# Patient Record
Sex: FEMALE | Race: OTHER | HISPANIC OR LATINO
[De-identification: names, ages, dates, MRNs, and addresses within clinical notes are randomized per-mention and may not be internally consistent; named-entity substitution may affect disease eponyms.]

---

## 2020-03-30 ENCOUNTER — TRANSCRIPTION ENCOUNTER (OUTPATIENT)
Age: 25
End: 2020-03-30

## 2020-03-31 ENCOUNTER — ASOB RESULT (OUTPATIENT)
Age: 25
End: 2020-03-31

## 2020-03-31 ENCOUNTER — INPATIENT (INPATIENT)
Facility: HOSPITAL | Age: 25
LOS: 3 days | Discharge: HOME | End: 2020-04-04
Attending: OBSTETRICS & GYNECOLOGY | Admitting: OBSTETRICS & GYNECOLOGY
Payer: MEDICAID

## 2020-03-31 ENCOUNTER — APPOINTMENT (OUTPATIENT)
Dept: ANTEPARTUM | Facility: CLINIC | Age: 25
End: 2020-03-31
Payer: MEDICAID

## 2020-03-31 ENCOUNTER — RESULT REVIEW (OUTPATIENT)
Age: 25
End: 2020-03-31

## 2020-03-31 VITALS — WEIGHT: 119.05 LBS | HEIGHT: 58 IN

## 2020-03-31 DIAGNOSIS — Z34.80 ENCOUNTER FOR SUPERVISION OF OTHER NORMAL PREGNANCY, UNSPECIFIED TRIMESTER: ICD-10-CM

## 2020-03-31 DIAGNOSIS — O26.899 OTHER SPECIFIED PREGNANCY RELATED CONDITIONS, UNSPECIFIED TRIMESTER: ICD-10-CM

## 2020-03-31 DIAGNOSIS — Z3A.00 WEEKS OF GESTATION OF PREGNANCY NOT SPECIFIED: ICD-10-CM

## 2020-03-31 PROBLEM — Z00.00 ENCOUNTER FOR PREVENTIVE HEALTH EXAMINATION: Status: ACTIVE | Noted: 2020-03-31

## 2020-03-31 LAB
ABO RH CONFIRMATION: SIGNIFICANT CHANGE UP
AMNISURE ROM (RUPTURE OF MEMBRANES): NEGATIVE — SIGNIFICANT CHANGE UP
APTT BLD: 38.9 SEC — HIGH (ref 27.5–36.3)
BASOPHILS # BLD AUTO: 0.02 K/UL — SIGNIFICANT CHANGE UP (ref 0–0.2)
BASOPHILS NFR BLD AUTO: 0.2 % — SIGNIFICANT CHANGE UP (ref 0–2)
BLD GP AB SCN SERPL QL: SIGNIFICANT CHANGE UP
EOSINOPHIL # BLD AUTO: 0.01 K/UL — SIGNIFICANT CHANGE UP (ref 0–0.5)
EOSINOPHIL NFR BLD AUTO: 0.1 % — SIGNIFICANT CHANGE UP (ref 0–6)
FIBRINOGEN PPP-MCNC: 562 MG/DL — HIGH (ref 350–510)
HCT VFR BLD CALC: 31 % — LOW (ref 34.5–45)
HGB BLD-MCNC: 10.2 G/DL — LOW (ref 11.5–15.5)
IMM GRANULOCYTES NFR BLD AUTO: 1.5 % — SIGNIFICANT CHANGE UP (ref 0–1.5)
INR BLD: 0.93 RATIO — SIGNIFICANT CHANGE UP (ref 0.88–1.16)
LYMPHOCYTES # BLD AUTO: 1.12 K/UL — SIGNIFICANT CHANGE UP (ref 1–3.3)
LYMPHOCYTES # BLD AUTO: 12 % — LOW (ref 13–44)
MCHC RBC-ENTMCNC: 28.3 PG — SIGNIFICANT CHANGE UP (ref 27–34)
MCHC RBC-ENTMCNC: 32.9 GM/DL — SIGNIFICANT CHANGE UP (ref 32–36)
MCV RBC AUTO: 85.9 FL — SIGNIFICANT CHANGE UP (ref 80–100)
MONOCYTES # BLD AUTO: 0.42 K/UL — SIGNIFICANT CHANGE UP (ref 0–0.9)
MONOCYTES NFR BLD AUTO: 4.5 % — SIGNIFICANT CHANGE UP (ref 2–14)
NEUTROPHILS # BLD AUTO: 7.66 K/UL — HIGH (ref 1.8–7.4)
NEUTROPHILS NFR BLD AUTO: 81.7 % — HIGH (ref 43–77)
NRBC # BLD: 0 /100 WBCS — SIGNIFICANT CHANGE UP (ref 0–0)
PLATELET # BLD AUTO: 214 K/UL — SIGNIFICANT CHANGE UP (ref 150–400)
PROTHROM AB SERPL-ACNC: 10.5 SEC — SIGNIFICANT CHANGE UP (ref 10–12.9)
RBC # BLD: 3.61 M/UL — LOW (ref 3.8–5.2)
RBC # FLD: 13.8 % — SIGNIFICANT CHANGE UP (ref 10.3–14.5)
WBC # BLD: 9.37 K/UL — SIGNIFICANT CHANGE UP (ref 3.8–10.5)
WBC # FLD AUTO: 9.37 K/UL — SIGNIFICANT CHANGE UP (ref 3.8–10.5)

## 2020-03-31 PROCEDURE — 76816 OB US FOLLOW-UP PER FETUS: CPT

## 2020-03-31 RX ORDER — CEFAZOLIN SODIUM 1 G
2000 VIAL (EA) INJECTION ONCE
Refills: 0 | Status: COMPLETED | OUTPATIENT
Start: 2020-03-31 | End: 2020-03-31

## 2020-03-31 RX ORDER — CITRIC ACID/SODIUM CITRATE 300-500 MG
30 SOLUTION, ORAL ORAL ONCE
Refills: 0 | Status: COMPLETED | OUTPATIENT
Start: 2020-03-31 | End: 2020-03-31

## 2020-03-31 RX ORDER — SODIUM CHLORIDE 9 MG/ML
1000 INJECTION, SOLUTION INTRAVENOUS ONCE
Refills: 0 | Status: COMPLETED | OUTPATIENT
Start: 2020-03-31 | End: 2020-04-01

## 2020-03-31 RX ORDER — FAMOTIDINE 10 MG/ML
20 INJECTION INTRAVENOUS ONCE
Refills: 0 | Status: COMPLETED | OUTPATIENT
Start: 2020-03-31 | End: 2020-03-31

## 2020-03-31 RX ORDER — OXYTOCIN 10 UNIT/ML
333.33 VIAL (ML) INJECTION
Qty: 20 | Refills: 0 | Status: DISCONTINUED | OUTPATIENT
Start: 2020-03-31 | End: 2020-04-04

## 2020-03-31 RX ORDER — ACETAMINOPHEN 500 MG
1000 TABLET ORAL ONCE
Refills: 0 | Status: COMPLETED | OUTPATIENT
Start: 2020-03-31 | End: 2020-03-31

## 2020-03-31 RX ORDER — SODIUM CHLORIDE 9 MG/ML
1000 INJECTION, SOLUTION INTRAVENOUS
Refills: 0 | Status: DISCONTINUED | OUTPATIENT
Start: 2020-03-31 | End: 2020-04-04

## 2020-03-31 RX ORDER — SODIUM CHLORIDE 9 MG/ML
1000 INJECTION, SOLUTION INTRAVENOUS
Refills: 0 | Status: DISCONTINUED | OUTPATIENT
Start: 2020-03-31 | End: 2020-04-02

## 2020-03-31 RX ORDER — METOCLOPRAMIDE HCL 10 MG
10 TABLET ORAL ONCE
Refills: 0 | Status: DISCONTINUED | OUTPATIENT
Start: 2020-03-31 | End: 2020-04-02

## 2020-03-31 RX ADMIN — Medication 100 MILLIGRAM(S): at 23:50

## 2020-03-31 RX ADMIN — FAMOTIDINE 20 MILLIGRAM(S): 10 INJECTION INTRAVENOUS at 23:52

## 2020-03-31 RX ADMIN — SODIUM CHLORIDE 125 MILLILITER(S): 9 INJECTION, SOLUTION INTRAVENOUS at 22:43

## 2020-03-31 RX ADMIN — Medication 30 MILLILITER(S): at 23:30

## 2020-03-31 RX ADMIN — Medication 400 MILLIGRAM(S): at 21:20

## 2020-04-01 LAB
HBV SURFACE AG SERPL QL IA: SIGNIFICANT CHANGE UP
HIV 1 & 2 AB SERPL IA.RAPID: SIGNIFICANT CHANGE UP
HIV 1+2 AB+HIV1 P24 AG SERPL QL IA: SIGNIFICANT CHANGE UP
SARS-COV-2 RNA SPEC QL NAA+PROBE: DETECTED
T PALLIDUM AB TITR SER: NEGATIVE — SIGNIFICANT CHANGE UP

## 2020-04-01 PROCEDURE — 88307 TISSUE EXAM BY PATHOLOGIST: CPT | Mod: 26

## 2020-04-01 RX ORDER — OXYCODONE HYDROCHLORIDE 5 MG/1
5 TABLET ORAL ONCE
Refills: 0 | Status: DISCONTINUED | OUTPATIENT
Start: 2020-04-01 | End: 2020-04-04

## 2020-04-01 RX ORDER — OXYCODONE HYDROCHLORIDE 5 MG/1
5 TABLET ORAL ONCE
Refills: 0 | Status: DISCONTINUED | OUTPATIENT
Start: 2020-04-01 | End: 2020-04-01

## 2020-04-01 RX ORDER — ASCORBIC ACID 60 MG
500 TABLET,CHEWABLE ORAL DAILY
Refills: 0 | Status: DISCONTINUED | OUTPATIENT
Start: 2020-04-01 | End: 2020-04-03

## 2020-04-01 RX ORDER — FERROUS SULFATE 325(65) MG
325 TABLET ORAL DAILY
Refills: 0 | Status: DISCONTINUED | OUTPATIENT
Start: 2020-04-01 | End: 2020-04-04

## 2020-04-01 RX ORDER — ACETAMINOPHEN 500 MG
1000 TABLET ORAL EVERY 6 HOURS
Refills: 0 | Status: COMPLETED | OUTPATIENT
Start: 2020-04-01 | End: 2020-04-02

## 2020-04-01 RX ORDER — OXYCODONE HYDROCHLORIDE 5 MG/1
5 TABLET ORAL
Refills: 0 | Status: DISCONTINUED | OUTPATIENT
Start: 2020-04-01 | End: 2020-04-04

## 2020-04-01 RX ORDER — TETANUS TOXOID, REDUCED DIPHTHERIA TOXOID AND ACELLULAR PERTUSSIS VACCINE, ADSORBED 5; 2.5; 8; 8; 2.5 [IU]/.5ML; [IU]/.5ML; UG/.5ML; UG/.5ML; UG/.5ML
0.5 SUSPENSION INTRAMUSCULAR ONCE
Refills: 0 | Status: DISCONTINUED | OUTPATIENT
Start: 2020-04-01 | End: 2020-04-04

## 2020-04-01 RX ORDER — HEPARIN SODIUM 5000 [USP'U]/ML
5000 INJECTION INTRAVENOUS; SUBCUTANEOUS EVERY 12 HOURS
Refills: 0 | Status: DISCONTINUED | OUTPATIENT
Start: 2020-04-01 | End: 2020-04-04

## 2020-04-01 RX ORDER — DIPHENHYDRAMINE HCL 50 MG
25 CAPSULE ORAL EVERY 6 HOURS
Refills: 0 | Status: DISCONTINUED | OUTPATIENT
Start: 2020-04-01 | End: 2020-04-04

## 2020-04-01 RX ORDER — LANOLIN
1 OINTMENT (GRAM) TOPICAL EVERY 6 HOURS
Refills: 0 | Status: DISCONTINUED | OUTPATIENT
Start: 2020-04-01 | End: 2020-04-04

## 2020-04-01 RX ORDER — OXYTOCIN 10 UNIT/ML
333.33 VIAL (ML) INJECTION
Qty: 20 | Refills: 0 | Status: DISCONTINUED | OUTPATIENT
Start: 2020-04-01 | End: 2020-04-04

## 2020-04-01 RX ORDER — SODIUM CHLORIDE 9 MG/ML
1000 INJECTION, SOLUTION INTRAVENOUS
Refills: 0 | Status: DISCONTINUED | OUTPATIENT
Start: 2020-04-01 | End: 2020-04-04

## 2020-04-01 RX ORDER — SIMETHICONE 80 MG/1
80 TABLET, CHEWABLE ORAL EVERY 4 HOURS
Refills: 0 | Status: DISCONTINUED | OUTPATIENT
Start: 2020-04-01 | End: 2020-04-04

## 2020-04-01 RX ORDER — ACETAMINOPHEN 500 MG
975 TABLET ORAL EVERY 6 HOURS
Refills: 0 | Status: COMPLETED | OUTPATIENT
Start: 2020-04-01 | End: 2021-02-28

## 2020-04-01 RX ORDER — MAGNESIUM HYDROXIDE 400 MG/1
30 TABLET, CHEWABLE ORAL
Refills: 0 | Status: DISCONTINUED | OUTPATIENT
Start: 2020-04-01 | End: 2020-04-04

## 2020-04-01 RX ADMIN — Medication 400 MILLIGRAM(S): at 11:04

## 2020-04-01 RX ADMIN — Medication 500 MILLIGRAM(S): at 18:01

## 2020-04-01 RX ADMIN — OXYCODONE HYDROCHLORIDE 5 MILLIGRAM(S): 5 TABLET ORAL at 21:04

## 2020-04-01 RX ADMIN — Medication 400 MILLIGRAM(S): at 18:01

## 2020-04-01 RX ADMIN — SODIUM CHLORIDE 75 MILLILITER(S): 9 INJECTION, SOLUTION INTRAVENOUS at 02:00

## 2020-04-01 RX ADMIN — Medication 400 MILLIGRAM(S): at 06:53

## 2020-04-01 RX ADMIN — Medication 1 TABLET(S): at 11:04

## 2020-04-01 RX ADMIN — SODIUM CHLORIDE 2000 MILLILITER(S): 9 INJECTION, SOLUTION INTRAVENOUS at 00:00

## 2020-04-01 RX ADMIN — Medication 325 MILLIGRAM(S): at 11:04

## 2020-04-01 RX ADMIN — HEPARIN SODIUM 5000 UNIT(S): 5000 INJECTION INTRAVENOUS; SUBCUTANEOUS at 14:00

## 2020-04-01 RX ADMIN — Medication 1000 MILLIUNIT(S)/MIN: at 00:54

## 2020-04-01 NOTE — CHART NOTE - NSCHARTNOTEFT_GEN_A_CORE
Pt seen at bedside offers no complaints. Denies n/v, CP; SOB; palpitations; or dizziness    T(C): 36.8 (04-01-20 @ 06:30), Max: 36.9 (04-01-20 @ 02:00)  HR: 78 (04-01-20 @ 06:30) (66 - 90)  BP: 108/68 (04-01-20 @ 06:30) (93/51 - 108/68)  RR: 16 (04-01-20 @ 06:30) (12 - 16)  SpO2: 97% (04-01-20 @ 06:30) (97% - 100%)    abd: soft/nt, fundus firm, dressing in place C/D/I  pelvic: minimal lochia  ext: venodynes in place; no calf pain    A/P: POD #0 s/p rpt c/s c/b fever, possible covid 19   follow up COVID results   cont close monitor   cont post op care  d/w Dr. Singh

## 2020-04-02 ENCOUNTER — TRANSCRIPTION ENCOUNTER (OUTPATIENT)
Age: 25
End: 2020-04-02

## 2020-04-02 LAB
BASOPHILS # BLD AUTO: 0.01 K/UL — SIGNIFICANT CHANGE UP (ref 0–0.2)
BASOPHILS NFR BLD AUTO: 0.1 % — SIGNIFICANT CHANGE UP (ref 0–2)
EOSINOPHIL # BLD AUTO: 0.05 K/UL — SIGNIFICANT CHANGE UP (ref 0–0.5)
EOSINOPHIL NFR BLD AUTO: 0.6 % — SIGNIFICANT CHANGE UP (ref 0–6)
HCT VFR BLD CALC: 29.8 % — LOW (ref 34.5–45)
HGB BLD-MCNC: 9.7 G/DL — LOW (ref 11.5–15.5)
IMM GRANULOCYTES NFR BLD AUTO: 1.6 % — HIGH (ref 0–1.5)
LYMPHOCYTES # BLD AUTO: 1.64 K/UL — SIGNIFICANT CHANGE UP (ref 1–3.3)
LYMPHOCYTES # BLD AUTO: 18.5 % — SIGNIFICANT CHANGE UP (ref 13–44)
MCHC RBC-ENTMCNC: 28.2 PG — SIGNIFICANT CHANGE UP (ref 27–34)
MCHC RBC-ENTMCNC: 32.6 GM/DL — SIGNIFICANT CHANGE UP (ref 32–36)
MCV RBC AUTO: 86.6 FL — SIGNIFICANT CHANGE UP (ref 80–100)
MEV IGM SER-ACNC: <20 AU/ML — SIGNIFICANT CHANGE UP (ref 0–19.9)
MONOCYTES # BLD AUTO: 0.46 K/UL — SIGNIFICANT CHANGE UP (ref 0–0.9)
MONOCYTES NFR BLD AUTO: 5.2 % — SIGNIFICANT CHANGE UP (ref 2–14)
NEUTROPHILS # BLD AUTO: 6.57 K/UL — SIGNIFICANT CHANGE UP (ref 1.8–7.4)
NEUTROPHILS NFR BLD AUTO: 74 % — SIGNIFICANT CHANGE UP (ref 43–77)
NRBC # BLD: 0 /100 WBCS — SIGNIFICANT CHANGE UP (ref 0–0)
PLATELET # BLD AUTO: 279 K/UL — SIGNIFICANT CHANGE UP (ref 150–400)
RBC # BLD: 3.44 M/UL — LOW (ref 3.8–5.2)
RBC # FLD: 14.2 % — SIGNIFICANT CHANGE UP (ref 10.3–14.5)
WBC # BLD: 8.87 K/UL — SIGNIFICANT CHANGE UP (ref 3.8–10.5)
WBC # FLD AUTO: 8.87 K/UL — SIGNIFICANT CHANGE UP (ref 3.8–10.5)

## 2020-04-02 RX ORDER — OXYCODONE HYDROCHLORIDE 5 MG/1
5 TABLET ORAL ONCE
Refills: 0 | Status: DISCONTINUED | OUTPATIENT
Start: 2020-04-02 | End: 2020-04-02

## 2020-04-02 RX ORDER — ACETAMINOPHEN 500 MG
975 TABLET ORAL EVERY 6 HOURS
Refills: 0 | Status: DISCONTINUED | OUTPATIENT
Start: 2020-04-02 | End: 2020-04-04

## 2020-04-02 RX ADMIN — SIMETHICONE 80 MILLIGRAM(S): 80 TABLET, CHEWABLE ORAL at 21:29

## 2020-04-02 RX ADMIN — Medication 400 MILLIGRAM(S): at 00:00

## 2020-04-02 RX ADMIN — SODIUM CHLORIDE 75 MILLILITER(S): 9 INJECTION, SOLUTION INTRAVENOUS at 00:00

## 2020-04-02 RX ADMIN — Medication 1 TABLET(S): at 14:57

## 2020-04-02 RX ADMIN — OXYCODONE HYDROCHLORIDE 5 MILLIGRAM(S): 5 TABLET ORAL at 07:17

## 2020-04-02 RX ADMIN — Medication 975 MILLIGRAM(S): at 21:32

## 2020-04-02 RX ADMIN — HEPARIN SODIUM 5000 UNIT(S): 5000 INJECTION INTRAVENOUS; SUBCUTANEOUS at 01:02

## 2020-04-02 RX ADMIN — Medication 500 MILLIGRAM(S): at 14:57

## 2020-04-02 RX ADMIN — Medication 325 MILLIGRAM(S): at 14:57

## 2020-04-02 RX ADMIN — HEPARIN SODIUM 5000 UNIT(S): 5000 INJECTION INTRAVENOUS; SUBCUTANEOUS at 14:57

## 2020-04-02 NOTE — DISCHARGE NOTE OB - HOSPITAL COURSE
patient admitted for oligohydramnios  repeat  performed  covid +,  asymptomatic, ID consult done  otherwise routine post op course  patient discharged home in stable condition once all milestone met

## 2020-04-02 NOTE — DISCHARGE NOTE OB - MEDICATION SUMMARY - MEDICATIONS TO TAKE
I will START or STAY ON the medications listed below when I get home from the hospital:    acetaminophen 325 mg oral tablet  -- 2 tab(s) by mouth every 6 hours, As Needed   -- Indication: For pain/fever    Prenatal Multivitamins with Folic Acid 1 mg oral tablet  -- 1 tab(s) by mouth once a day  -- Indication: For Supplementation    Colace 100 mg oral capsule  -- 1 cap(s) by mouth 2 times a day   -- Medication should be taken with plenty of water.    -- Indication: For Stool softener I will START or STAY ON the medications listed below when I get home from the hospital:    acetaminophen 325 mg oral tablet  -- 2 tab(s) by mouth every 6 hours, As Needed   -- Indication: For pain/fever    Prenatal Multivitamins with Folic Acid 1 mg oral tablet  -- 1 tab(s) by mouth once a day  -- Indication: For Supplementation    Colace 100 mg oral capsule  -- 1 cap(s) by mouth 2 times a day   -- Medication should be taken with plenty of water.    -- Indication: For Stool softener    Vitamin C 1000 mg oral tablet  -- 1 tab(s) by mouth 2 times a day   -- Indication: For for covid -19 +

## 2020-04-02 NOTE — PROGRESS NOTE ADULT - SUBJECTIVE AND OBJECTIVE BOX
Patient seen resting comfortably.  No new complaints.  Denies HA, CP, SOB, N/V/D, dizziness, palpitations, worsening abdominal pain, worsening vaginal bleeding, or any other concerns. + Ambulation, + void without difficulty, + flatus - tolerating regular diet. Attempting breastfeeding.     Vital Signs Last 24 Hrs  T(C): 36.3 (2020 06:56), Max: 37.1 (2020 18:00)  T(F): 97.4 (2020 06:56), Max: 98.8 (2020 18:00)  HR: 78 (2020 06:56) (78 - 96)  BP: 103/70 (2020 06:56) (103/70 - 115/57)  BP(mean): 82 (2020 00:09) (74 - 82)  RR: 16 (2020 06:56) (16 - 18)  SpO2: 98% (2020 06:56) (97% - 100%)    Gen: A&O x 3, NAD  Chest: CTABL  Cardiac: S1+S2+ RRR  Breast: Soft, nontender, nonengorged  Abdomen: Nontender, nondistended, +BS, Incision C/D/I  Gyn: Minimal bleeding  Extremities: Nontender, DTRS 2+, no worsening edema, venodynes intact                          10.2   9.37  )-----------( 214      ( 31 Mar 2020 22:07 )             31.0       A/P:  Patient is a 24 year old P2 s/p  section, POD #1. Patient is COVID positive and under isolation.    -Pain management as needed  -Advance as per protocol  -OOB and ambulate  -Repeat CBC in am  -Encourage breastfeeding

## 2020-04-02 NOTE — DISCHARGE NOTE OB - CARE PLAN
Principal Discharge DX:	 delivery delivered  Goal:	pain management, encourage breast feeding  Assessment and plan of treatment:	Continue breastfeeding.  Motrin as needed for pain.  Ambulate daily.  No heavy lifting or anything per vagina x 6 weeks - no sex, tampons, douching, tub baths, etc.  Follow up in office in 1-2 weeks for incision check, and then at 6 weeks for postpartum check.  Secondary Diagnosis:	Coronavirus infection  Assessment and plan of treatment:	ID consult done  wash hands frequently  Call your Dr if your have any symptoms ( ie fever, shortness of breath or cough)  practice social distancing  Express breast milk via pump while contagious

## 2020-04-02 NOTE — DISCHARGE NOTE OB - CARE PROVIDER_API CALL
Preeti Singh)  Obstetrics and Gynecology  [address] [place] [address]  Phone: [phone]  Fax: [phone]  Established Patient  Follow Up Time: 2 weeks

## 2020-04-02 NOTE — DISCHARGE NOTE OB - MATERIALS PROVIDED
(1) bedfast Back To Sleep Handout/Shaken Baby Prevention Handout/Birth Certificate Instructions/[facility] Hearing Screen Program/Breastfeeding Mother’s Support Group Information/Guide to Postpartum Care/Discharge Medication Information for Patients and Families Pocket Guide/[facility] [place] Screening Program/Breastfeeding Guide and Packet

## 2020-04-02 NOTE — DISCHARGE NOTE OB - PLAN OF CARE
pain management, encourage breast feeding Continue breastfeeding.  Motrin as needed for pain.  Ambulate daily.  No heavy lifting or anything per vagina x 6 weeks - no sex, tampons, douching, tub baths, etc.  Follow up in office in 1-2 weeks for incision check, and then at 6 weeks for postpartum check. ID consult done  wash hands frequently  Call your Dr if your have any symptoms ( ie fever, shortness of breath or cough)  practice social distancing  Express breast milk via pump while contagious

## 2020-04-03 DIAGNOSIS — B34.2 CORONAVIRUS INFECTION, UNSPECIFIED: ICD-10-CM

## 2020-04-03 LAB
HCT VFR BLD CALC: 30.3 % — LOW (ref 34.5–45)
HGB BLD-MCNC: 9.9 G/DL — LOW (ref 11.5–15.5)
MCHC RBC-ENTMCNC: 28.1 PG — SIGNIFICANT CHANGE UP (ref 27–34)
MCHC RBC-ENTMCNC: 32.7 GM/DL — SIGNIFICANT CHANGE UP (ref 32–36)
MCV RBC AUTO: 86.1 FL — SIGNIFICANT CHANGE UP (ref 80–100)
NRBC # BLD: 0 /100 WBCS — SIGNIFICANT CHANGE UP (ref 0–0)
PLATELET # BLD AUTO: 381 K/UL — SIGNIFICANT CHANGE UP (ref 150–400)
RBC # BLD: 3.52 M/UL — LOW (ref 3.8–5.2)
RBC # FLD: 14.1 % — SIGNIFICANT CHANGE UP (ref 10.3–14.5)
WBC # BLD: 7.32 K/UL — SIGNIFICANT CHANGE UP (ref 3.8–10.5)
WBC # FLD AUTO: 7.32 K/UL — SIGNIFICANT CHANGE UP (ref 3.8–10.5)

## 2020-04-03 RX ORDER — DOCUSATE SODIUM 100 MG
1 CAPSULE ORAL
Qty: 60 | Refills: 0
Start: 2020-04-03 | End: 2020-05-02

## 2020-04-03 RX ORDER — ACETAMINOPHEN 500 MG
2 TABLET ORAL
Qty: 80 | Refills: 0
Start: 2020-04-03 | End: 2020-04-12

## 2020-04-03 RX ADMIN — HEPARIN SODIUM 5000 UNIT(S): 5000 INJECTION INTRAVENOUS; SUBCUTANEOUS at 13:19

## 2020-04-03 RX ADMIN — SODIUM CHLORIDE 75 MILLILITER(S): 9 INJECTION, SOLUTION INTRAVENOUS at 06:48

## 2020-04-03 RX ADMIN — Medication 500 MILLIGRAM(S): at 13:19

## 2020-04-03 RX ADMIN — Medication 1 TABLET(S): at 13:19

## 2020-04-03 RX ADMIN — HEPARIN SODIUM 5000 UNIT(S): 5000 INJECTION INTRAVENOUS; SUBCUTANEOUS at 02:00

## 2020-04-03 RX ADMIN — Medication 0.5 MILLILITER(S): at 02:00

## 2020-04-03 RX ADMIN — Medication 975 MILLIGRAM(S): at 13:18

## 2020-04-03 RX ADMIN — Medication 975 MILLIGRAM(S): at 18:54

## 2020-04-03 RX ADMIN — Medication 325 MILLIGRAM(S): at 13:19

## 2020-04-03 NOTE — PROGRESS NOTE ADULT - SUBJECTIVE AND OBJECTIVE BOX
OBGYN PA NOTE POD2  Patient seen and evaluated at bedside,  resting comfortably w/o any acute  complaints.  Denies fever, HA, CP, SOB, N/V/D, dizziness, palpitations, worsening abdominal pain, worsening vaginal bleeding, or any other concerns.  Ambulating and voiding without difficulty.  Passing flatus and tolerating regular diet.  Attempting to breastfeed.     Vital Signs Last 24 Hrs  T(C): 36.4 (2020 06:50), Max: 37.6 (2020 18:37)  T(F): 97.5 (2020 06:50), Max: 99.7 (2020 18:37)  HR: 81 (2020 06:50) (81 - 96)  BP: 91/59 (2020 06:50) (91/59 - 115/76)  BP(mean): --  RR: 18 (2020 06:50) (16 - 18)  SpO2: 99% (2020 06:50) (98% - 99%)    Physical Exam:     Gen: A&Ox 3, NAD  Chest: CTAB/L  Cardiac: S1+S2+ RRR  Breast: Soft, nontender, nonengorged  Abdomen: Soft, nontender, Fundus firm below umbilicus, +BS. incision clean, dry and intact  Gyn: Mild lochia,   Extremities: Nontender, DTRS 2+, no worsening edema                          9.9    7.32  )-----------( 381      ( 2020 06:57 )             30.3         A/P:  24y POD # 2 s/p repeat  @36w3d, oligohydramnios, Covid +. otherwise  in stable condition   -ID consult done via telephone with Dr Medrano.  precautions reviewed with patient  - discharge instruction given  - patient verbalized understanding  d/w Dr Singh OBGYN PA NOTE POD2  Patient seen and evaluated at bedside,  resting comfortably w/o any acute  complaints.  Denies fever, HA, CP, SOB, N/V/D, dizziness, palpitations, worsening abdominal pain, worsening vaginal bleeding, or any other concerns.  Ambulating and voiding without difficulty.  Passing flatus and tolerating regular diet.  Attempting to breastfeed.     Vital Signs Last 24 Hrs  T(C): 36.4 (2020 06:50), Max: 37.6 (2020 18:37)  T(F): 97.5 (2020 06:50), Max: 99.7 (2020 18:37)  HR: 81 (2020 06:50) (81 - 96)  BP: 91/59 (2020 06:50) (91/59 - 115/76)  BP(mean): --  RR: 18 (2020 06:50) (16 - 18)  SpO2: 99% (2020 06:50) (98% - 99%)    Physical Exam:     Gen: A&Ox 3, NAD  Chest: CTAB/L  Cardiac: S1+S2+ RRR  Breast: Soft, nontender, nonengorged  Abdomen: Soft, nontender, Fundus firm below umbilicus, +BS. incision clean, dry and intact  Gyn: Mild lochia,   Extremities: Nontender, DTRS 2+, no worsening edema                          9.9    7.32  )-----------( 381      ( 2020 06:57 )             30.3       A/P:  24y POD # 2 s/p repeat  @36w3d, oligohydramnios, Covid +. otherwise  in stable condition   -ID consult done via telephone with Dr Medrano.  precautions reviewed with patient, no further intervention needed at this time  - continue post op care  - for possible POD 2 discharge home once  is cleared  d/w Dr Singh

## 2020-04-03 NOTE — PROGRESS NOTE ADULT - ASSESSMENT
A/P:  24y POD # 2 s/p repeat  @36w3d, oligohydramnios, Covid +. otherwise in stable condition   -ID consult done via telephone with Dr Medrano. Precautions reviewed with patient  - discharge instruction given  - patient verbalized understanding  d/w Dr Singh A/P:  24y POD # 2 s/p repeat  @36w3d, oligohydramnios, Covid +. otherwise  in stable condition   -ID consult done via telephone with Dr Medrano.  precautions reviewed with patient, no further intervention needed at this time  - continue post op care  - for possible POD 2 discharge home once  is cleared  d/w Dr Singh

## 2020-04-04 VITALS
DIASTOLIC BLOOD PRESSURE: 64 MMHG | HEART RATE: 74 BPM | SYSTOLIC BLOOD PRESSURE: 99 MMHG | RESPIRATION RATE: 16 BRPM | TEMPERATURE: 98 F | OXYGEN SATURATION: 98 %

## 2020-04-04 PROCEDURE — 88307 TISSUE EXAM BY PATHOLOGIST: CPT

## 2020-04-04 PROCEDURE — 85027 COMPLETE CBC AUTOMATED: CPT

## 2020-04-04 PROCEDURE — 86901 BLOOD TYPING SEROLOGIC RH(D): CPT

## 2020-04-04 PROCEDURE — 86900 BLOOD TYPING SEROLOGIC ABO: CPT

## 2020-04-04 PROCEDURE — 86850 RBC ANTIBODY SCREEN: CPT

## 2020-04-04 PROCEDURE — 86780 TREPONEMA PALLIDUM: CPT

## 2020-04-04 PROCEDURE — 87340 HEPATITIS B SURFACE AG IA: CPT

## 2020-04-04 PROCEDURE — 90707 MMR VACCINE SC: CPT

## 2020-04-04 PROCEDURE — 84112 EVAL AMNIOTIC FLUID PROTEIN: CPT

## 2020-04-04 PROCEDURE — 86762 RUBELLA ANTIBODY: CPT

## 2020-04-04 PROCEDURE — 87635 SARS-COV-2 COVID-19 AMP PRB: CPT

## 2020-04-04 PROCEDURE — G0463: CPT

## 2020-04-04 PROCEDURE — 85730 THROMBOPLASTIN TIME PARTIAL: CPT

## 2020-04-04 PROCEDURE — 86923 COMPATIBILITY TEST ELECTRIC: CPT

## 2020-04-04 PROCEDURE — 86703 HIV-1/HIV-2 1 RESULT ANTBDY: CPT

## 2020-04-04 PROCEDURE — 85384 FIBRINOGEN ACTIVITY: CPT

## 2020-04-04 PROCEDURE — 59025 FETAL NON-STRESS TEST: CPT

## 2020-04-04 PROCEDURE — 36415 COLL VENOUS BLD VENIPUNCTURE: CPT

## 2020-04-04 PROCEDURE — 59050 FETAL MONITOR W/REPORT: CPT

## 2020-04-04 PROCEDURE — 87389 HIV-1 AG W/HIV-1&-2 AB AG IA: CPT

## 2020-04-04 PROCEDURE — 85610 PROTHROMBIN TIME: CPT

## 2020-04-04 RX ORDER — ASCORBIC ACID 60 MG
1 TABLET,CHEWABLE ORAL
Qty: 60 | Refills: 0
Start: 2020-04-04 | End: 2020-05-03

## 2020-04-04 RX ADMIN — HEPARIN SODIUM 5000 UNIT(S): 5000 INJECTION INTRAVENOUS; SUBCUTANEOUS at 01:30

## 2020-04-04 RX ADMIN — Medication 975 MILLIGRAM(S): at 01:30

## 2020-04-04 NOTE — PROGRESS NOTE ADULT - SUBJECTIVE AND OBJECTIVE BOX
seen also w dr lopez    pod#3 confirmed +coronavirus  asymp  oxgyen ra 97-99%  denies cough/fever/sob     in level 2  pt +flatus  denies n/v/sob  doing well post op    Vital Signs Last 24 Hrs  T(C): 36.9 (2020 06:00), Max: 36.9 (2020 06:00)  T(F): 98.4 (2020 06:00), Max: 98.4 (2020 06:00)  HR: 74 (2020 06:00) (70 - 81)  BP: 99/64 (2020 06:00) (98/61 - 101/63)  BP(mean): --  RR: 16 (2020 06:00) (16 - 16)  SpO2: 98% (2020 06:00) (97% - 98%)    abd +BS soft ND NT no guarding  inc site c/d steris in place  uterus firm below umbilicus  lochia min  ext no edema                          9.9    7.32  )-----------( 381      ( 2020 06:57 )             30.3

## 2020-04-04 NOTE — PROGRESS NOTE ADULT - ASSESSMENT
seen also w dr lopez    pod#3 confirmed +coronavirus  asymp  oxgyen ra 97-99%  denies cough/fever/sob   in level 2  -dc home w covid-19 instructions and packet  -per id supportive management  -wound check in 2weeks